# Patient Record
Sex: FEMALE | Race: WHITE | Employment: OTHER | URBAN - METROPOLITAN AREA
[De-identification: names, ages, dates, MRNs, and addresses within clinical notes are randomized per-mention and may not be internally consistent; named-entity substitution may affect disease eponyms.]

---

## 2017-04-27 ENCOUNTER — TELEPHONE (OUTPATIENT)
Dept: HEMATOLOGY | Age: 57
End: 2017-04-27

## 2017-07-19 ENCOUNTER — HOSPITAL ENCOUNTER (OUTPATIENT)
Dept: LAB | Age: 57
Discharge: HOME | End: 2017-07-19
Payer: OTHER GOVERNMENT

## 2017-07-19 ENCOUNTER — OFFICE VISIT (OUTPATIENT)
Dept: HEMATOLOGY | Age: 57
End: 2017-07-19

## 2017-07-19 VITALS
SYSTOLIC BLOOD PRESSURE: 149 MMHG | OXYGEN SATURATION: 94 % | RESPIRATION RATE: 18 BRPM | DIASTOLIC BLOOD PRESSURE: 97 MMHG | BODY MASS INDEX: 21.92 KG/M2 | HEIGHT: 64 IN | TEMPERATURE: 97.3 F | WEIGHT: 128.4 LBS | HEART RATE: 66 BPM

## 2017-07-19 DIAGNOSIS — B18.2 CHRONIC HEPATITIS C WITHOUT HEPATIC COMA (HCC): Primary | ICD-10-CM

## 2017-07-19 DIAGNOSIS — B18.2 CHRONIC HEPATITIS C WITHOUT HEPATIC COMA (HCC): ICD-10-CM

## 2017-07-19 LAB
ALBUMIN SERPL BCP-MCNC: 4 G/DL (ref 3.4–5)
ALBUMIN/GLOB SERPL: 0.9 {RATIO} (ref 0.8–1.7)
ALP SERPL-CCNC: 42 U/L (ref 45–117)
ALT SERPL-CCNC: 82 U/L (ref 13–56)
ANION GAP BLD CALC-SCNC: 9 MMOL/L (ref 3–18)
AST SERPL W P-5'-P-CCNC: 51 U/L (ref 15–37)
BASOPHILS # BLD AUTO: 0 K/UL (ref 0–0.06)
BASOPHILS # BLD: 1 % (ref 0–2)
BILIRUB DIRECT SERPL-MCNC: 0.2 MG/DL (ref 0–0.2)
BILIRUB SERPL-MCNC: 0.6 MG/DL (ref 0.2–1)
BUN SERPL-MCNC: 14 MG/DL (ref 7–18)
BUN/CREAT SERPL: 23 (ref 12–20)
CALCIUM SERPL-MCNC: 9.1 MG/DL (ref 8.5–10.1)
CHLORIDE SERPL-SCNC: 105 MMOL/L (ref 100–108)
CO2 SERPL-SCNC: 27 MMOL/L (ref 21–32)
CREAT SERPL-MCNC: 0.62 MG/DL (ref 0.6–1.3)
DIFFERENTIAL METHOD BLD: NORMAL
EOSINOPHIL # BLD: 0.1 K/UL (ref 0–0.4)
EOSINOPHIL NFR BLD: 1 % (ref 0–5)
ERYTHROCYTE [DISTWIDTH] IN BLOOD BY AUTOMATED COUNT: 14.1 % (ref 11.6–14.5)
GLOBULIN SER CALC-MCNC: 4.5 G/DL (ref 2–4)
GLUCOSE SERPL-MCNC: 111 MG/DL (ref 74–99)
HCT VFR BLD AUTO: 41.9 % (ref 35–45)
HGB BLD-MCNC: 14.1 G/DL (ref 12–16)
LYMPHOCYTES # BLD AUTO: 33 % (ref 21–52)
LYMPHOCYTES # BLD: 1.7 K/UL (ref 0.9–3.6)
MCH RBC QN AUTO: 31.2 PG (ref 24–34)
MCHC RBC AUTO-ENTMCNC: 33.7 G/DL (ref 31–37)
MCV RBC AUTO: 92.7 FL (ref 74–97)
MONOCYTES # BLD: 0.4 K/UL (ref 0.05–1.2)
MONOCYTES NFR BLD AUTO: 8 % (ref 3–10)
NEUTS SEG # BLD: 3 K/UL (ref 1.8–8)
NEUTS SEG NFR BLD AUTO: 57 % (ref 40–73)
PLATELET # BLD AUTO: 240 K/UL (ref 135–420)
PMV BLD AUTO: 9.6 FL (ref 9.2–11.8)
POTASSIUM SERPL-SCNC: 3.7 MMOL/L (ref 3.5–5.5)
PROT SERPL-MCNC: 8.5 G/DL (ref 6.4–8.2)
RBC # BLD AUTO: 4.52 M/UL (ref 4.2–5.3)
SODIUM SERPL-SCNC: 141 MMOL/L (ref 136–145)
WBC # BLD AUTO: 5.2 K/UL (ref 4.6–13.2)

## 2017-07-19 PROCEDURE — 87522 HEPATITIS C REVRS TRNSCRPJ: CPT | Performed by: INTERNAL MEDICINE

## 2017-07-19 PROCEDURE — 80048 BASIC METABOLIC PNL TOTAL CA: CPT | Performed by: INTERNAL MEDICINE

## 2017-07-19 PROCEDURE — 36415 COLL VENOUS BLD VENIPUNCTURE: CPT | Performed by: INTERNAL MEDICINE

## 2017-07-19 PROCEDURE — 87900 PHENOTYPE INFECT AGENT DRUG: CPT | Performed by: INTERNAL MEDICINE

## 2017-07-19 PROCEDURE — 86708 HEPATITIS A ANTIBODY: CPT | Performed by: INTERNAL MEDICINE

## 2017-07-19 PROCEDURE — 86704 HEP B CORE ANTIBODY TOTAL: CPT | Performed by: INTERNAL MEDICINE

## 2017-07-19 PROCEDURE — 80076 HEPATIC FUNCTION PANEL: CPT | Performed by: INTERNAL MEDICINE

## 2017-07-19 PROCEDURE — 87521 HEPATITIS C PROBE&RVRS TRNSC: CPT | Performed by: INTERNAL MEDICINE

## 2017-07-19 PROCEDURE — 85025 COMPLETE CBC W/AUTO DIFF WBC: CPT | Performed by: INTERNAL MEDICINE

## 2017-07-19 PROCEDURE — 87902 NFCT AGT GNTYP ALYS HEP C: CPT | Performed by: INTERNAL MEDICINE

## 2017-07-19 PROCEDURE — 86706 HEP B SURFACE ANTIBODY: CPT | Performed by: INTERNAL MEDICINE

## 2017-07-19 RX ORDER — ZOLPIDEM TARTRATE 5 MG/1
TABLET ORAL
COMMUNITY

## 2017-07-19 NOTE — MR AVS SNAPSHOT
Visit Information Date & Time Provider Department Dept. Phone Encounter #  
 7/19/2017  4:30 PM Tk Hawley MD [facility] of [address] [place] [address] [id number] Follow-up Instructions Return in about 6 weeks (around 8/30/2017) for [facility]. Upcoming Health Maintenance Date Due Hepatitis C Screening 1960 DTaP/Tdap/Td series (1 - Tdap) 2/26/1981 PAP AKA CERVICAL CYTOLOGY 2/26/1981 BREAST CANCER SCRN MAMMOGRAM 2/26/2010 FOBT Q 1 YEAR AGE 50-75 2/26/2010 INFLUENZA AGE 9 TO ADULT 8/1/2017 Allergies as of 7/19/2017  Review Complete On: 7/19/2017 By: Tk Hawley MD  
  
 Severity Noted Reaction Type Reactions Penicillins  07/19/2017    Itching Current Immunizations  Never Reviewed No immunizations on file. Not reviewed this visit You Were Diagnosed With   
  
 Codes Comments Chronic hepatitis C without hepatic coma (HCC)    -  Primary ICD-10-CM: B18.2 ICD-9-CM: 070.54 Vitals BP Pulse Temp Resp Height(growth percentile) (!) 149/97 (BP 1 Location: Right arm, BP Patient Position: Sitting) 66 97.3 °F (36.3 °C) (Tympanic) 18 5' 4\" (1.626 m) Weight(growth percentile) SpO2 BMI OB Status Smoking Status 128 lb 6.4 oz (58.2 kg) 94% 22.04 kg/m2 Hysterectomy Former Smoker BMI and BSA Data Body Mass Index Body Surface Area 22.04 kg/m 2 1.62 m 2 Your Updated Medication List  
  
   
This list is accurate as of: 7/19/17  5:35 PM.  Always use your most recent med list.  
  
  
  
  
 AMBIEN 5 mg tablet Generic drug:  zolpidem Take  by mouth nightly as needed for Sleep. Follow-up Instructions Return in about 6 weeks (around 8/30/2017) for [facility]. To-Do List   
 07/19/2017 Lab:  CBC WITH AUTOMATED DIFF   
  
 07/19/2017 Lab:  HCV NS5A DRUG RESISTANCE ASSAY   
  
 07/19/2017 Lab:  HCV RNA BY SOFI QL,RFLX TO QT   
  
 07/19/2017 Lab:  HEP A AB, TOTAL 07/19/2017 Lab:  HEP B SURFACE AB   
  
 07/19/2017 Lab:  HEP C GENOTYPE   
  
 07/19/2017 Lab:  HEPATIC FUNCTION PANEL   
  
 07/19/2017 Lab:  HEPATITIS B CORE AB, TOTAL   
  
 07/19/2017 Lab:  METABOLIC PANEL, BASIC   
  
 07/19/2017 Imaging:  US ABD LTD W ELASTOGRAPHY Introducing [facility] & HEALTH SERVICES! [facility] introduces [employer] patient portal. Now you can access parts of your medical record, email your doctor's office, and request medication refills online. 1. In your internet browser, go to [email or web address] 2. Click on the First Time User? Click Here link in the Sign In box. You will see the New Member Sign Up page. 3. Enter your Data[employer]Sixty Access Code exactly as it appears below. You will not need to use this code after youve completed the sign-up process. If you do not sign up before the expiration date, you must request a new code. · Data3[employer]ty Access Code: [email or web address] Expires: 10/17/2017  5:35 PM 
 
4. Enter the last four digits of your Social Security Number (xxxx) and Date of Birth (mm/dd/yyyy) as indicated and click Submit. You will be taken to the next sign-up page. 5. Create a Data3Sixty ID. This will be your Data3Sixty login ID and cannot be changed, so think of one that is secure and easy to remember. 6. Create a Data3Sixty password. You can change your password at any time. 7. Enter your Password Reset Question and Answer. This can be used at a later time if you forget your password. 8. Enter your e-mail address. You will receive e-mail notification when new information is available in [address] 9. Click Sign Up. You can now view and download portions of your medical record. 10. Click the Download Summary menu link to download a portable copy of your medical information. If you have questions, please visit the Frequently Asked Questions section of the Data3Sixty website.  Remember, Data3Sixty is NOT to be used for urgent needs. For medical emergencies, dial 911. Now available from your iPhone and Android! Please provide this summary of care documentation to your next provider. Your primary care clinician is listed as Khai Abdullahi. If you have any questions after today's visit, please call [phone].

## 2017-07-19 NOTE — PROGRESS NOTES
[address], [place], [place], [place]   April JAYME Whiting PA-C Dasie Billow, NP Lyda Daring, NP        at [facility]     [address] [facility], [address] [address]  [address].     [phone]     FAX: [phone]    at [facility], [address] [place] [address], [address]     [phone]     FAX: [phone]         Patient Care Team:  Mariluz Holguin MD as PCP - General (Family Practice)      Problem List  Date Reviewed: 7/19/2017          Codes Class Noted    Chronic hepatitis C [facility]) ICD-10-CM: B18.2  ICD-9-CM: 070.54  7/19/2017                The physicians listed above have asked me to see Jermaine Curran in consultation regarding chronic HCV and its management. All medical records sent by the referring physicians were reviewed     The patient is a 62 y.o.  female who was noted to have abnormalities in liver chemistries and subsequently tested positive for chronic HCV   in 3/3017. Risk factors for acquiring HCV are not apparent       There was no history of acute incteric hepatitis at the time of these risk factors. Ultrasound of the liver was performed in 3/2017. The results of the imaging are not available at this time. An assessment of liver fibrosis with biopsy or elastography has not been performed. The patient has never received treatment for chronic HCV. The most recent laboratory studies indicate that the liver transaminases are elevated, alkaline phosphatase is normal, tests of hepatic synthetic and metabolic function are normal, and the platelet count is normal.      The patient has no symptoms which can be attributed to the liver disorder. The patient has not experienced fatigue, pain in the right side over the liver,     The patient completes all daily activities without any functional limitations.       ALLERGIES  Allergies   Allergen Reactions    Penicillins Itching       MEDICATIONS  Current Outpatient Prescriptions   Medication Sig    zolpidem (AMBIEN) 5 mg tablet Take  by mouth nightly as needed for Sleep. No current facility-administered medications for this visit. SYSTEM REVIEW NOT RELATED TO LIVER DISEASE OR REVIEWED ABOVE:  Constitution systems: Negative for fever, chills, weight gain, weight loss. Eyes: Negative for visual changes. ENT: Negative for sore throat, painful swallowing. Respiratory: Negative for cough, hemoptysis, SOB. Cardiology: Negative for chest pain, palpitations. GI:  Negative for constipation or diarrhea. : Negative for urinary frequency, dysuria, hematuria, nocturia. Skin: Negative for rash. Hematology: Negative for easy bruising, blood clots. Musculo-skelatal: Negative for back pain, muscle pain, weakness. Neurologic: Negative for headaches, dizziness, vertigo, memory problems not related to HE. Psychology: Negative for anxiety, depression. FAMILY HISTORY:  The patient has no knowledge of the father's medical condition. The mother  of Sarcoidosis. There is no family history of liver disease. SOCIAL HISTORY:  The patient is . The spouse has been tested for HCV and is negative. The patient has 2 children, and 2 grandchildren. The patient stopped using tobacco products in . The patient has never consumed significant amounts of alcohol. The patient currently works full time as a . PHYSICAL EXAMINATION:  Visit Vitals    BP (!) 149/97 (BP 1 Location: Right arm, BP Patient Position: Sitting)    Pulse 66    Temp 97.3 °F (36.3 °C) (Tympanic)    Resp 18    Ht 5' 4\" (1.626 m)    Wt 128 lb 6.4 oz (58.2 kg)    SpO2 94%    BMI 22.04 kg/m2     General: No acute distress. Eyes: Sclera anicteric. ENT: No oral lesions. Thyroid normal.  Nodes: No adenopathy. Skin: No spider angiomata. No jaundice.   No palmar erythema. Respiratory: Lungs clear to auscultation. Cardiovascular: Regular heart rate. No murmurs. No JVD. Abdomen: Soft non-tender. Liver size normal to percussion/palpation. Spleen not palpable. No obvious ascites. Extremities: No edema. No muscle wasting. No gross arthritic changes. Neurologic: Alert and oriented. Cranial nerves grossly intact. No asterixis. LABORATORY STUDIES:  [facility] Ref Rng & Units 7/19/2017   WBC 4.6 - 13.2 K/uL 5.2   ANC 1.8 - 8.0 K/UL 3.0   HGB 12.0 - 16.0 g/dL 14.1    - 420 K/uL 240   AST 15 - 37 U/L 51 (H)   ALT 13 - 56 U/L 82 (H)   Alk Phos 45 - 117 U/L 42 (L)   Bili, Total 0.2 - 1.0 MG/DL 0.6   Bili, Direct 0.0 - 0.2 MG/DL 0.2   Albumin 3.4 - 5.0 g/dL 4.0   BUN 7.0 - 18 MG/DL 14   Creat 0.6 - 1.3 MG/DL 0.62   Na 136 - 145 mmol/L 141   K 3.5 - 5.5 mmol/L 3.7   Cl 100 - 108 mmol/L 105   CO2 21 - 32 mmol/L 27   Glucose 74 - 99 mg/dL 111 (H)     SEROLOGIES:  Serologies Latest Ref Rng & Units 7/19/2017   Hep A Ab, Total NEGATIVE   NEGATIVE   Hep B Core Ab, Total NEGATIVE   NEGATIVE   Hep B Surface Ab >10.0 mIU/mL <3.10 (L)   Hep B Surface Ab Interp POS   NEGATIVE (A)   HCV RT-PCR, Quant IU/mL [id number]     LIVER HISTOLOGY:  Not available or performed    ENDOSCOPIC PROCEDURES:  Not available or performed    RADIOLOGY:  Not available or performed    OTHER TESTING:  Not available or performed    ASSESSMENT AND PLAN:  Chronic HCV of unclear severity. Liver function is normal.  The platelet count is normal.      Have performed laboratory testing to monitor liver function and degree of liver injury. This included BMP, hepatic panel, CBC with platelet count,     Will perform and/or review results of HCV viral load and HCV genotype to define the specific treatment and duration of treatment that will be required. Will perform serologic and virologic studies to assess for other causes of chronic liver disease.       Will perform imaging of the liver with ultrasound. The need to assess liver fibrosis was discussed. Sheer wave elastography can assess liver fibrosis and determine if a patient has advanced fibrosis or cirrhosis without the need for liver biopsy. Sheer wave elastography is now available at [facility]. This will be scheduled. The patient has not previously been treated for HCV. Discussed the treatment alternatives. The SVR/cure rate for HCV now exceeds 90% with just oral anti-viral therapy and no interferon injections or significant side effects for most patients with HCV. The specific treatment is dependent upon genotype, viral load and histology. The patient was directed to continue all current medications at the current dosages. There are no contraindications for the patient to take any medications that are necessary for treatment of other medical issues. The patient was counseled regarding alcohol consumption. The patient was counseled regarding use of illicit drugs. Vaccination for viral hepatitis A and B is recommended since the patient has no serologic evidence of previous exposure or vaccination with immunity. All of the above issues were discussed with the patient. All questions were answered. The patient expressed a clear understanding of the above. [address] [address] in 6 weeks to initiate HCV treatment.     Makeda Franco MD  Liver [place] of [facility], [address] [place] [address], [address]  [phone]

## 2017-07-20 LAB
HBV SURFACE AB SER QL IA: NEGATIVE
HBV SURFACE AB SERPL IA-ACNC: <3.1 MIU/ML
HEP BS AB COMMENT,HBSAC: ABNORMAL

## 2017-07-21 LAB
HAV AB SER QL IA: NEGATIVE
HBV CORE AB SERPL QL IA: NEGATIVE

## 2017-07-22 LAB
HCV RNA SERPL NAA+PROBE-ACNC: NORMAL IU/ML
HCV RNA SERPL NAA+PROBE-LOG IU: 6.77 LOG10 IU/ML
HCV RNA SERPL QL NAA+PROBE: POSITIVE
TEST INFORMATION:, 550031: NORMAL

## 2017-07-23 LAB
HCV GENTYP SERPL NAA+PROBE: 4
PLEASE NOTE, 550474: NORMAL

## 2017-07-27 LAB
HCV NS5 MUT DET ISLT GENOTYP: NORMAL
HCV RESIS PANELL ISLT GENOTYP: NORMAL
REF LAB TEST METHOD: NORMAL

## 2018-02-05 ENCOUNTER — OFFICE VISIT (OUTPATIENT)
Dept: HEMATOLOGY | Age: 58
End: 2018-02-05

## 2018-02-05 VITALS
SYSTOLIC BLOOD PRESSURE: 137 MMHG | BODY MASS INDEX: 23.73 KG/M2 | HEART RATE: 105 BPM | OXYGEN SATURATION: 98 % | WEIGHT: 139 LBS | RESPIRATION RATE: 18 BRPM | DIASTOLIC BLOOD PRESSURE: 103 MMHG | HEIGHT: 64 IN | TEMPERATURE: 98 F

## 2018-02-05 DIAGNOSIS — B18.2 CHRONIC HEPATITIS C WITHOUT HEPATIC COMA (HCC): Primary | ICD-10-CM

## 2018-02-05 NOTE — PROGRESS NOTES
Jia Alicea is a 62 y.o. female    No chief complaint on file. 1. Have you been to the ER, urgent care clinic or hospitalized since your last visit? YES.     2. Have you seen or consulted any other health care providers outside of the [address] [place] [address] since your last visit (Include any pap smears or colon screening)? YES  Patient went to [facility] ER for stomach virus.   Learning Assessment 7/19/2017   PRIMARY LEARNER Patient   PRIMARY LANGUAGE ENGLISH   LEARNER PREFERENCE PRIMARY READING   ANSWERED BY self   RELATIONSHIP SELF

## 2018-02-05 NOTE — MR AVS SNAPSHOT
[address] [address] 
[phone] Patient: Haja Bahena MRN: [id number] VEM:[birth year] Visit Information Date & Time Provider Department Dept. Phone Encounter #  
 2/5/2018  4:30 PM MD Ann Coleman [address] of  [address] [id number] Upcoming Health Maintenance Date Due Pneumococcal 19-64 Medium Risk (1 of 1 - PPSV23) 2/26/1979 DTaP/Tdap/Td series (1 - Tdap) 2/26/1981 PAP AKA CERVICAL CYTOLOGY 2/26/1981 BREAST CANCER SCRN MAMMOGRAM 2/26/2010 FOBT Q 1 YEAR AGE 50-75 2/26/2010 Influenza Age 5 to Adult 8/1/2017 Allergies as of 2/5/2018  Review Complete On: 2/5/2018 By: Jean Marie Salcedo Severity Noted Reaction Type Reactions Penicillins  07/19/2017    Itching Current Immunizations  Never Reviewed No immunizations on file. Not reviewed this visit You Were Diagnosed With   
  
 Codes Comments Chronic hepatitis C without hepatic coma (HCC)    -  Primary ICD-10-CM: B18.2 ICD-9-CM: 070.54 Vitals BP Pulse Temp Resp Height(growth percentile) (!) 137/103 (BP 1 Location: Right arm, BP Patient Position: Sitting) (!) 105 98 °F (36.7 °C) (Tympanic) 18 5' 4\" (1.626 m) Weight(growth percentile) SpO2 BMI OB Status Smoking Status 139 lb (63 kg) 98% 23.86 kg/m2 Hysterectomy Former Smoker BMI and BSA Data Body Mass Index Body Surface Area  
 23.86 kg/m 2 1.69 m 2 Your Updated Medication List  
  
   
This list is accurate as of: 2/5/18  5:02 PM.  Always use your most recent med list.  
  
  
  
  
 AMBIEN 5 mg tablet Generic drug:  zolpidem Take  by mouth nightly as needed for Sleep. To-Do List   
 02/05/2018 Imaging:  US ABD LTD W ELASTOGRAPHY Introducing [facility] & HEALTH SERVICES!    
 Mallory Alvarenga introduces [employer] patient portal. Now you can access parts of your medical record, email your doctor's office, and request medication refills online. 1. In your internet browser, go to [email or web address] "[employer]"/Branch [email or web address] 2. Click on the First Time User? Click Here link in the Sign In box. You will see the New Member Sign Up page. 3. Enter your [employer] Access Code exactly as it appears below. You will not need to use this code after youve completed the sign-up process. If you do not sign up before the expiration date, you must request a new code. · [employer] Access Code: [email or web address] Expires: 5/6/2018  5:02 PM 
 
4. Enter the last four digits of your Social Security Number (xxxx) and Date of Birth (mm/dd/yyyy) as indicated and click Submit. You will be taken to the next sign-up page. 5. Create a NetDevices ID. This will be your NetDevices login ID and cannot be changed, so think of one that is secure and easy to remember. 6. Create a Net[employer]ices password. You can change your password at any time. 7. Enter your Password Reset Question and Answer. This can be used at a later time if you forget your password. 8. Enter your e-mail address. You will receive e-mail notification when new information is available in [address] 9. Click Sign Up. You can now view and download portions of your medical record. 10. Click the Download Summary menu link to download a portable copy of your medical information. If you have questions, please visit the Frequently Asked Questions section of the Net[employer] website. Remember, NetDevices is NOT to be used for urgent needs. For medical emergencies, dial 911. Now available from your iPhone and Android! Please provide this summary of care documentation to your next provider. Your primary care clinician is listed as Nelly Roy. If you have any questions after today's visit, please call [phone].

## 2018-02-06 NOTE — PROGRESS NOTES
[address] MD, [place], [place], [place]       Nickolas Salgado, ADAMARIS Caicedo, [facility]   Jorge Adam, JAYME Whiting [facility] [address]    at [facility] AT [address], [address] [place] [address], [address].    [phone]    FAX: [address] [place] [address], [address]    [phone]    FAX: [phone]       Patient Care Team:  Kaiser Boyle MD as PCP - General (Family Practice)      Problem List  Date Reviewed: 7/23/2017          Codes Class Noted    Chronic hepatitis C [facility]) ICD-10-CM: B18.2  ICD-9-CM: 070.54  7/19/2017              Zackary Zhong returns to the [facility] for management of chronic HCV. The active problem list, all pertinent past medical history, medications, and laboratory findings related to the liver disorder were reviewed with the patient. The patient is a 62 y.o.  female who was noted to have abnormalities in liver chemistries and subsequently tested positive for chronic HCV in 3/3017. Risk factors for acquiring HCV are not apparent       Ultrasound of the liver was performed in 3/2017. The results of the imaging are not available at this time. An assessment of liver fibrosis with biopsy or elastography has not been performed. The patient has never received treatment for chronic HCV. The most recent laboratory studies indicate that the liver transaminases are elevated, alkaline phosphatase is normal, tests of hepatic synthetic and metabolic function are normal, and the platelet count is normal.      The patient has no symptoms which can be attributed to the liver disorder.     The patient has not experienced fatigue, pain in the right side over the liver. The patient completes all daily activities without any functional limitations. ALLERGIES  Allergies   Allergen Reactions    Penicillins Itching       MEDICATIONS  Current Outpatient Prescriptions   Medication Sig    zolpidem (AMBIEN) 5 mg tablet Take  by mouth nightly as needed for Sleep. No current facility-administered medications for this visit. SYSTEM REVIEW NOT RELATED TO LIVER DISEASE OR REVIEWED ABOVE:  Constitution systems: Negative for fever, chills, weight gain, weight loss. Eyes: Negative for visual changes. ENT: Negative for sore throat, painful swallowing. Respiratory: Negative for cough, hemoptysis, SOB. Cardiology: Negative for chest pain, palpitations. GI:  Negative for constipation or diarrhea. : Negative for urinary frequency, dysuria, hematuria, nocturia. Skin: Negative for rash. Hematology: Negative for easy bruising, blood clots. Musculo-skelatal: Negative for back pain, muscle pain, weakness. Neurologic: Negative for headaches, dizziness, vertigo, memory problems not related to HE. Psychology: Negative for anxiety, depression. FAMILY HISTORY:  The patient has no knowledge of the father's medical condition. The mother  of Sarcoidosis. There is no family history of liver disease. SOCIAL HISTORY:  The patient is . The spouse has been tested for HCV and is negative. The patient has 2 children, and 2 grandchildren. The patient stopped using tobacco products in . The patient has never consumed significant amounts of alcohol. The patient currently works full time as a . PHYSICAL EXAMINATION:  Visit Vitals    BP (!) 137/103 (BP 1 Location: Right arm, BP Patient Position: Sitting)    Pulse (!) 105    Temp 98 °F (36.7 °C) (Tympanic)    Resp 18    Ht 5' 4\" (1.626 m)    Wt 139 lb (63 kg)    SpO2 98%    BMI 23.86 kg/m2     General: No acute distress.    Eyes: Sclera anicteric. ENT: No oral lesions. Thyroid normal.  Nodes: No adenopathy. Skin: No spider angiomata. No jaundice. No palmar erythema. Respiratory: Lungs clear to auscultation. Cardiovascular: Regular heart rate. No murmurs. No JVD. Abdomen: Soft non-tender. Liver size normal to percussion/palpation. Spleen not palpable. No obvious ascites. Extremities: No edema. No muscle wasting. No gross arthritic changes. Neurologic: Alert and oriented. Cranial nerves grossly intact. No asterixis. LABORATORY STUDIES:  Liver [facility] Ref Rng & Units 7/19/2017   WBC 4.6 - 13.2 K/uL 5.2   ANC 1.8 - 8.0 K/UL 3.0   HGB 12.0 - 16.0 g/dL 14.1    - 420 K/uL 240   AST 15 - 37 U/L 51 (H)   ALT 13 - 56 U/L 82 (H)   Alk Phos 45 - 117 U/L 42 (L)   Bili, Total 0.2 - 1.0 MG/DL 0.6   Bili, Direct 0.0 - 0.2 MG/DL 0.2   Albumin 3.4 - 5.0 g/dL 4.0   BUN 7.0 - 18 MG/DL 14   Creat 0.6 - 1.3 MG/DL 0.62   Na 136 - 145 mmol/L 141   K 3.5 - 5.5 mmol/L 3.7   Cl 100 - 108 mmol/L 105   CO2 21 - 32 mmol/L 27   Glucose 74 - 99 mg/dL 111 (H)     SEROLOGIES:  Serologies Latest Ref Rng & Units 7/19/2017   Hep A Ab, Total NEGATIVE   NEGATIVE   Hep B Core Ab, Total NEGATIVE   NEGATIVE   Hep B Surface Ab >10.0 mIU/mL <3.10 (L)   Hep B Surface Ab Interp POS   NEGATIVE (A)   HCV RT-PCR, Quant IU/mL [id number]     7/2017. HCV genotype 4    LIVER HISTOLOGY:  Not available or performed    ENDOSCOPIC PROCEDURES:  Not available or performed    RADIOLOGY:  Not available or performed    OTHER TESTING:  Not available or performed    ASSESSMENT AND PLAN:  Chronic HCV of unclear severity. Liver function is normal.  The platelet count is normal.      Will perform and/or review results of HCV viral load and HCV genotype to define the specific treatment and duration of treatment that will be required. Will perform serologic and virologic studies to assess for other causes of chronic liver disease.       Will perform imaging of the liver with ultrasound. The need to assess liver fibrosis was discussed. Sheer wave elastography can assess liver fibrosis and determine if a patient has advanced fibrosis or cirrhosis without the need for liver biopsy. Sheer wave elastography is now available at [facility]. This will be scheduled. The patient has not previously been treated for HCV. The patient has HCV genotype 4. Discussed the treatment alternatives. The SVR/cure rate for HCV now exceeds 90% with just oral anti-viral therapy and no interferon injections or significant side effects for most patients with HCV. The specific treatment is dependent upon genotype, viral load and histology. The patient should be treated with Harvoni (sofasbuvir and ledipasvir), Mavyret (glecaprevir and piprentasvir) or Epclusa (sofosbuvir and valpitasvir). The SVR/cure rate for is over 97%. The patient was directed to continue all current medications at the current dosages. There are no contraindications for the patient to take any medications that are necessary for treatment of other medical issues. The patient was counseled regarding alcohol consumption. Vaccination for viral hepatitis A and B is recommended since the patient has no serologic evidence of previous exposure or vaccination with immunity. All of the above issues were discussed with the patient. All questions were answered. The patient expressed a clear understanding of the above. The patient is extremely apprehensive about taking medication of any kind. Lengthy discussion regarding risks of untreated HCV vs side effects of medication. Patient has agreed to have ultrasound with elastography to assess liver fibrosis. Will discuss results and options after this is completed.      Junior Hubbard MD  Liver [place] of [address] [place] [address], [address] [place] [address] Street [address]  [phone]

## 2018-02-21 ENCOUNTER — HOSPITAL ENCOUNTER (OUTPATIENT)
Dept: ULTRASOUND IMAGING | Age: 58
Discharge: HOME | End: 2018-02-21
Attending: NURSE PRACTITIONER
Payer: OTHER GOVERNMENT

## 2018-02-21 DIAGNOSIS — B18.2 CHRONIC HEPATITIS C WITHOUT HEPATIC COMA (HCC): ICD-10-CM

## 2018-02-21 PROCEDURE — 0346T US ABD LTD W ELASTOGRAPHY: CPT

## 2018-02-21 NOTE — PROGRESS NOTES
Elastography suggests normal to mild fibrosis.  Suggest patient schedule an appointment to discuss HCV treatment

## 2018-02-26 NOTE — PROGRESS NOTES
Called patient to inform patient of NP Emely Jones's findings. Patient confirmed understanding. Patient scheduled to see JAYME Caruso 04/03/2018.

## 2022-03-19 PROBLEM — B18.2 CHRONIC HEPATITIS C (HCC): Status: ACTIVE | Noted: 2017-07-19

## 2023-11-13 ENCOUNTER — HOSPITAL ENCOUNTER (OUTPATIENT)
Facility: HOSPITAL | Age: 63
Discharge: HOME | End: 2023-11-16
Attending: ORTHOPAEDIC SURGERY
Payer: OTHER GOVERNMENT

## 2023-11-13 DIAGNOSIS — M89.8X5 PAIN IN RIGHT FEMUR: ICD-10-CM

## 2023-11-13 DIAGNOSIS — S72.301A CLOSED FRACTURE OF SHAFT OF RIGHT FEMUR, UNSPECIFIED FRACTURE MORPHOLOGY, INITIAL ENCOUNTER (HCC): ICD-10-CM

## 2023-11-13 PROCEDURE — 73700 CT LOWER EXTREMITY W/O DYE: CPT

## 2024-07-26 ENCOUNTER — CLINICAL DOCUMENTATION (OUTPATIENT)
Age: 64
End: 2024-07-26

## 2024-08-01 ENCOUNTER — CLINICAL DOCUMENTATION (OUTPATIENT)
Age: 64
End: 2024-08-01

## 2024-08-01 NOTE — PROGRESS NOTES
Rcvd referral from Nathaly Zazueta auth# [id number] dos 7/3/2024 - 7/3/2025    Scanned into media for reference    efs

## 2024-08-02 RX ORDER — OXYCODONE HYDROCHLORIDE 5 MG/1
5 TABLET ORAL EVERY 6 HOURS PRN
COMMUNITY
End: 2024-08-31

## 2024-08-02 RX ORDER — CALCIUM CARBONATE/VITAMIN D3 600 MG-10
TABLET ORAL
COMMUNITY
Start: 2024-05-17

## 2024-08-02 RX ORDER — FAMOTIDINE 20 MG/1
20 TABLET, FILM COATED ORAL
COMMUNITY
Start: 2024-06-21 | End: 2024-08-31

## 2024-08-02 RX ORDER — AMOXICILLIN 250 MG
2 CAPSULE ORAL
COMMUNITY
Start: 2023-06-14 | End: 2024-08-31

## 2024-08-02 RX ORDER — TRAMADOL HYDROCHLORIDE 50 MG/1
50 TABLET ORAL EVERY 6 HOURS PRN
COMMUNITY
Start: 2023-08-07

## 2024-08-05 ENCOUNTER — OFFICE VISIT (OUTPATIENT)
Age: 64
End: 2024-08-05
Payer: OTHER GOVERNMENT

## 2024-08-05 ENCOUNTER — HOSPITAL ENCOUNTER (OUTPATIENT)
Facility: HOSPITAL | Age: 64
Setting detail: SPECIMEN
Discharge: HOME | End: 2024-08-08
Payer: OTHER GOVERNMENT

## 2024-08-05 VITALS
WEIGHT: 141 LBS | DIASTOLIC BLOOD PRESSURE: 88 MMHG | BODY MASS INDEX: 24.07 KG/M2 | HEIGHT: 64 IN | HEART RATE: 80 BPM | OXYGEN SATURATION: 98 % | SYSTOLIC BLOOD PRESSURE: 143 MMHG

## 2024-08-05 DIAGNOSIS — K76.89 LIVER CYST: ICD-10-CM

## 2024-08-05 DIAGNOSIS — B18.2 CHRONIC HEPATITIS C WITHOUT HEPATIC COMA (HCC): Primary | ICD-10-CM

## 2024-08-05 DIAGNOSIS — B18.2 CHRONIC HEPATITIS C WITHOUT HEPATIC COMA (HCC): ICD-10-CM

## 2024-08-05 LAB
ALBUMIN SERPL-MCNC: 4.2 G/DL (ref 3.4–5)
ALBUMIN/GLOB SERPL: 1.1 (ref 0.8–1.7)
ALP SERPL-CCNC: 55 U/L (ref 45–117)
ALT SERPL-CCNC: 62 U/L (ref 13–56)
ANION GAP SERPL CALC-SCNC: 6 MMOL/L (ref 3–18)
AST SERPL-CCNC: 38 U/L (ref 10–38)
BASOPHILS # BLD: 0 K/UL (ref 0–0.1)
BASOPHILS NFR BLD: 0 % (ref 0–2)
BILIRUB DIRECT SERPL-MCNC: 0.3 MG/DL (ref 0–0.2)
BILIRUB SERPL-MCNC: 1.1 MG/DL (ref 0.2–1)
BUN SERPL-MCNC: 12 MG/DL (ref 7–18)
BUN/CREAT SERPL: 20 (ref 12–20)
CALCIUM SERPL-MCNC: 9.5 MG/DL (ref 8.5–10.1)
CHLORIDE SERPL-SCNC: 105 MMOL/L (ref 100–111)
CO2 SERPL-SCNC: 28 MMOL/L (ref 21–32)
CREAT SERPL-MCNC: 0.59 MG/DL (ref 0.6–1.3)
DIFFERENTIAL METHOD BLD: ABNORMAL
EOSINOPHIL # BLD: 0.1 K/UL (ref 0–0.4)
EOSINOPHIL NFR BLD: 1 % (ref 0–5)
ERYTHROCYTE [DISTWIDTH] IN BLOOD BY AUTOMATED COUNT: 13.7 % (ref 11.6–14.5)
GLOBULIN SER CALC-MCNC: 3.7 G/DL (ref 2–4)
GLUCOSE SERPL-MCNC: 93 MG/DL (ref 74–99)
HCT VFR BLD AUTO: 45.8 % (ref 35–45)
HGB BLD-MCNC: 15.3 G/DL (ref 12–16)
IMM GRANULOCYTES # BLD AUTO: 0 K/UL (ref 0–0.04)
IMM GRANULOCYTES NFR BLD AUTO: 0 % (ref 0–0.5)
INR PPP: 1 (ref 0.9–1.1)
LYMPHOCYTES # BLD: 2 K/UL (ref 0.9–3.6)
LYMPHOCYTES NFR BLD: 28 % (ref 21–52)
MCH RBC QN AUTO: 31.7 PG (ref 24–34)
MCHC RBC AUTO-ENTMCNC: 33.4 G/DL (ref 31–37)
MCV RBC AUTO: 95 FL (ref 78–100)
MONOCYTES # BLD: 0.5 K/UL (ref 0.05–1.2)
MONOCYTES NFR BLD: 7 % (ref 3–10)
NEUTS SEG # BLD: 4.7 K/UL (ref 1.8–8)
NEUTS SEG NFR BLD: 63 % (ref 40–73)
NRBC # BLD: 0 K/UL (ref 0–0.01)
NRBC BLD-RTO: 0 PER 100 WBC
PLATELET # BLD AUTO: 293 K/UL (ref 135–420)
PMV BLD AUTO: 10.4 FL (ref 9.2–11.8)
POTASSIUM SERPL-SCNC: 3.8 MMOL/L (ref 3.5–5.5)
PROT SERPL-MCNC: 7.9 G/DL (ref 6.4–8.2)
PROTHROMBIN TIME: 13.3 SEC (ref 11.9–14.9)
RBC # BLD AUTO: 4.82 M/UL (ref 4.2–5.3)
SODIUM SERPL-SCNC: 139 MMOL/L (ref 136–145)
WBC # BLD AUTO: 7.4 K/UL (ref 4.6–13.2)

## 2024-08-05 PROCEDURE — 80048 BASIC METABOLIC PNL TOTAL CA: CPT

## 2024-08-05 PROCEDURE — 99204 OFFICE O/P NEW MOD 45 MIN: CPT | Performed by: INTERNAL MEDICINE

## 2024-08-05 PROCEDURE — 85025 COMPLETE CBC W/AUTO DIFF WBC: CPT

## 2024-08-05 PROCEDURE — 80076 HEPATIC FUNCTION PANEL: CPT

## 2024-08-05 PROCEDURE — 85610 PROTHROMBIN TIME: CPT

## 2024-08-05 PROCEDURE — 81596 NFCT DS CHRNC HCV 6 ASSAYS: CPT

## 2024-08-05 PROCEDURE — 87902 NFCT AGT GNTYP ALYS HEP C: CPT

## 2024-08-05 PROCEDURE — 87522 HEPATITIS C REVRS TRNSCRPJ: CPT

## 2024-08-05 PROCEDURE — 36415 COLL VENOUS BLD VENIPUNCTURE: CPT

## 2024-08-05 RX ORDER — ERGOCALCIFEROL 1.25 MG/1
CAPSULE, LIQUID FILLED ORAL
COMMUNITY
Start: 2024-05-17

## 2024-08-05 NOTE — PROGRESS NOTES
[facility]      Josr Amador MD, FACP, FACG, FAASLD      Cheyenne Figueroa, PA-C    Maureen Leahy, [facility]   Sharda Price, [facility]   Natasha Alli, [facility]  He Kaba, [facility]   Jie Renteria, [facility]   Jessica Lerner, [facility]      [facility]   at [facility]   [address], [address]   [place]  [address] [phone]   FAX: [phone]  [facility]   [address], [address]   [place]  [address] [phone]   FAX: [phone]       Patient Care Team:  Justin Perez MD as PCP - General (Internal Medicine)  Sia Jones MD (Internal Medicine)  Jess Betts MD (Family Medicine)      Patient Active Problem List   Diagnosis    Chronic hepatitis C (HCC)       Asuncion Oscar returns to the [facility] for management of chronic HCV.     The active problem list, all pertinent past medical history, medications, and laboratory findings related to the liver disorder were reviewed with the patient.    The patient is a 57 y.o.  female who was noted to have abnormalities in liver chemistries and subsequently tested positive for chronic HCV in 3/3017.    This is the first appointment at [facility] since 2/2018.    The patient has never received treatment for chronic HCV.    The most recent imaging of the liver was CT performed in 7/2024.  Results suggest the liver appears normal.  There is a non-enhancing mass 2.1 x 1.6 cm in right lobe, segment 6.    In the office today the patient has the following symptoms:  The patient feels well and has no complaints.    The patient is not experiencing the following symptoms which are commonly seen in this liver disorder:   fatigue,   pain in the right side over the liver,     The patient completes all daily activities without any functional  other medical problems in patients with chronic liver disease  There are no contraindications for the patient to take most medications that are necessary for treatment of other medical issues.    Counseling for alcohol in patients with chronic liver disease  The patient was counseled regarding alcohol consumption and the effect of alcohol on chronic liver disease.  The patient does not consume any significant amount of alcohol.    Vaccinations   Vaccination for viral hepatitis A and B is recommended since the patient has no serologic evidence of previous exposure or vaccination with immunity.    Routine vaccinations against other bacterial and viral agents can be performed as indicated.  Annual flu vaccination should be administered if indicated.      ALLERGIES  Allergies   Allergen Reactions    Penicillins Anaphylaxis, Hives, Itching, Rash and Other (See Comments)     per spouse       MEDICATIONS  Current Outpatient Medications   Medication Instructions    calcium carb-cholecalciferol 600-10 MG-MCG TABS per tab Oral    Sofosbuvir-Velpatasvir 400-100 MG TABS 1 tablet, Oral, DAILY    traMADol (ULTRAM) 50 mg, Oral, EVERY 6 HOURS PRN    vitamin D (ERGOCALCIFEROL) 1.25 MG (60990 UT) CAPS capsule        SYSTEM REVIEW NOT RELATED TO LIVER DISEASE OR REVIEWED ABOVE:  Constitution systems: Negative for fever, chills, weight gain, weight loss.  Eyes: Negative for visual changes.  ENT: Negative for sore throat, painful swallowing.  Respiratory: Negative for cough, hemoptysis, SOB.  Cardiology: Negative for chest pain, palpitations.  GI: Negative for constipation or diarrhea.  : Negative for urinary frequency, dysuria, hematuria, nocturia.  Skin: Negative for rash.  Hematology: Negative for easy bruising, blood clots.  Musculo-skelatal: Negative for back pain, muscle pain, weakness.  Neurologic: Negative for headaches, dizziness, vertigo, memory problems not related to HE.  Psychology: Negative for anxiety,

## 2024-08-06 LAB
A2 MACROGLOB SERPL-MCNC: 446 MG/DL (ref 110–276)
ALT SERPL-CCNC: 57 IU/L (ref 0–40)
APO A-I SERPL-MCNC: 159 MG/DL (ref 116–209)
BILIRUB SERPL-MCNC: 0.9 MG/DL (ref 0–1.2)
FIBROSIS SCORE: 0.65 (ref 0–0.21)
FIBROSIS SCORING:: ABNORMAL
FIBROSIS STAGE: ABNORMAL
GGT SERPL-CCNC: 20 IU/L (ref 0–60)
HAPTOGLOB SERPL-MCNC: 106 MG/DL (ref 37–355)
INTERPRETATION: ABNORMAL
LABORATORY COMMENT REPORT: ABNORMAL
LIMITATIONS:: ABNORMAL
Lab: ABNORMAL
NECROINFLAMM ACTIVITY SCORING:: 0.47 (ref 0–0.17)
NECROINFLAMM ACTIVITY SCORING:: ABNORMAL
NECROINFLAMMAT ACTIVITY GRADE: ABNORMAL

## 2024-08-08 LAB
HCV GENTYP SERPL NAA+PROBE: 4
LABORATORY COMMENT REPORT: NORMAL

## 2024-08-09 LAB
HCV RNA SERPL NAA+PROBE-LOG IU: 6.78 HCV LOG 10IU/ML
HCV RNA SERPL PROBE AMP-ACNC: ABNORMAL HCVIU/ML

## 2024-08-27 ENCOUNTER — TELEPHONE (OUTPATIENT)
Age: 64
End: 2024-08-27

## 2024-08-27 NOTE — TELEPHONE ENCOUNTER
Pt called wondering about the medication dr johnson discussed at her last visit. Pt says her pharmacy never received it. Please give her a call back to update her.

## 2024-08-30 NOTE — TELEPHONE ENCOUNTER
Spoke with patient and advised that notes have not been closed so there is a chance that the med has not been able to have been ordered yet because of that. Advised as soon as the notes are completed then the med can be ordered and then after that it can take a couple weeks to receive the order. Advised patient to give some more time and apologized for the delay. Patient understanding.    efs

## 2024-08-31 RX ORDER — VELPATASVIR AND SOFOSBUVIR 100; 400 MG/1; MG/1
1 TABLET, FILM COATED ORAL DAILY
Qty: 28 TABLET | Refills: 2 | Status: SHIPPED | OUTPATIENT
Start: 2024-08-31

## 2024-09-03 ENCOUNTER — TELEPHONE (OUTPATIENT)
Age: 64
End: 2024-09-03

## 2024-09-11 ENCOUNTER — TELEPHONE (OUTPATIENT)
Age: 64
End: 2024-09-11

## 2024-10-16 ENCOUNTER — CLINICAL DOCUMENTATION (OUTPATIENT)
Age: 64
End: 2024-10-16

## 2024-10-16 NOTE — PROGRESS NOTES
Patients disc of CT and US has been scanned in by radiology.     Disc mailed back to patient.    efs

## 2025-02-04 ENCOUNTER — TELEPHONE (OUTPATIENT)
Age: 65
End: 2025-02-04

## 2025-04-07 ENCOUNTER — OFFICE VISIT (OUTPATIENT)
Age: 65
End: 2025-04-07
Payer: MEDICARE

## 2025-04-07 ENCOUNTER — HOSPITAL ENCOUNTER (OUTPATIENT)
Facility: HOSPITAL | Age: 65
Setting detail: SPECIMEN
Discharge: HOME | End: 2025-04-10
Payer: MEDICARE

## 2025-04-07 VITALS
WEIGHT: 143.8 LBS | DIASTOLIC BLOOD PRESSURE: 81 MMHG | TEMPERATURE: 97.4 F | BODY MASS INDEX: 24.55 KG/M2 | OXYGEN SATURATION: 99 % | SYSTOLIC BLOOD PRESSURE: 147 MMHG | HEIGHT: 64 IN | HEART RATE: 80 BPM

## 2025-04-07 DIAGNOSIS — B18.2 CHRONIC HEPATITIS C WITHOUT HEPATIC COMA (HCC): Primary | ICD-10-CM

## 2025-04-07 DIAGNOSIS — B18.2 CHRONIC HEPATITIS C WITHOUT HEPATIC COMA (HCC): ICD-10-CM

## 2025-04-07 LAB
ALBUMIN SERPL-MCNC: 4 G/DL (ref 3.4–5)
ALBUMIN/GLOB SERPL: 1.1 (ref 0.8–1.7)
ALP SERPL-CCNC: 57 U/L (ref 45–117)
ALT SERPL-CCNC: 16 U/L (ref 13–56)
ANION GAP SERPL CALC-SCNC: 5 MMOL/L (ref 3–18)
AST SERPL-CCNC: 14 U/L (ref 10–38)
BASOPHILS # BLD: 0.06 K/UL (ref 0–0.1)
BASOPHILS NFR BLD: 0.7 % (ref 0–2)
BILIRUB DIRECT SERPL-MCNC: 0.2 MG/DL (ref 0–0.2)
BILIRUB SERPL-MCNC: 0.8 MG/DL (ref 0.2–1)
BUN SERPL-MCNC: 14 MG/DL (ref 7–18)
BUN/CREAT SERPL: 29 (ref 12–20)
CALCIUM SERPL-MCNC: 9.3 MG/DL (ref 8.5–10.1)
CHLORIDE SERPL-SCNC: 106 MMOL/L (ref 100–111)
CO2 SERPL-SCNC: 29 MMOL/L (ref 21–32)
CREAT SERPL-MCNC: 0.48 MG/DL (ref 0.6–1.3)
DIFFERENTIAL METHOD BLD: NORMAL
EOSINOPHIL # BLD: 0.12 K/UL (ref 0–0.4)
EOSINOPHIL NFR BLD: 1.4 % (ref 0–5)
ERYTHROCYTE [DISTWIDTH] IN BLOOD BY AUTOMATED COUNT: 13.8 % (ref 11.6–14.5)
GLOBULIN SER CALC-MCNC: 3.8 G/DL (ref 2–4)
GLUCOSE SERPL-MCNC: 102 MG/DL (ref 74–99)
HCT VFR BLD AUTO: 44.7 % (ref 35–45)
HGB BLD-MCNC: 14.7 G/DL (ref 12–16)
IMM GRANULOCYTES # BLD AUTO: 0.02 K/UL (ref 0–0.04)
IMM GRANULOCYTES NFR BLD AUTO: 0.2 % (ref 0–0.5)
LYMPHOCYTES # BLD: 2.36 K/UL (ref 0.9–3.3)
LYMPHOCYTES NFR BLD: 27.1 % (ref 21–52)
MCH RBC QN AUTO: 31.5 PG (ref 24–34)
MCHC RBC AUTO-ENTMCNC: 32.9 G/DL (ref 31–37)
MCV RBC AUTO: 95.9 FL (ref 78–100)
MONOCYTES # BLD: 0.66 K/UL (ref 0.05–1.2)
MONOCYTES NFR BLD: 7.6 % (ref 3–10)
NEUTS SEG # BLD: 5.49 K/UL (ref 1.8–8)
NEUTS SEG NFR BLD: 63 % (ref 40–73)
NRBC # BLD: 0 K/UL (ref 0–0.01)
NRBC BLD-RTO: 0 PER 100 WBC
PLATELET # BLD AUTO: 331 K/UL (ref 135–420)
PMV BLD AUTO: 10.3 FL (ref 9.2–11.8)
POTASSIUM SERPL-SCNC: 4 MMOL/L (ref 3.5–5.5)
PROT SERPL-MCNC: 7.8 G/DL (ref 6.4–8.2)
RBC # BLD AUTO: 4.66 M/UL (ref 4.2–5.3)
SODIUM SERPL-SCNC: 140 MMOL/L (ref 136–145)
WBC # BLD AUTO: 8.7 K/UL (ref 4.6–13.2)

## 2025-04-07 PROCEDURE — 36415 COLL VENOUS BLD VENIPUNCTURE: CPT

## 2025-04-07 PROCEDURE — 80076 HEPATIC FUNCTION PANEL: CPT

## 2025-04-07 PROCEDURE — 80048 BASIC METABOLIC PNL TOTAL CA: CPT

## 2025-04-07 PROCEDURE — 3017F COLORECTAL CA SCREEN DOC REV: CPT | Performed by: NURSE PRACTITIONER

## 2025-04-07 PROCEDURE — 1090F PRES/ABSN URINE INCON ASSESS: CPT | Performed by: NURSE PRACTITIONER

## 2025-04-07 PROCEDURE — 1036F TOBACCO NON-USER: CPT | Performed by: NURSE PRACTITIONER

## 2025-04-07 PROCEDURE — G8428 CUR MEDS NOT DOCUMENT: HCPCS | Performed by: NURSE PRACTITIONER

## 2025-04-07 PROCEDURE — 1123F ACP DISCUSS/DSCN MKR DOCD: CPT | Performed by: NURSE PRACTITIONER

## 2025-04-07 PROCEDURE — 85025 COMPLETE CBC W/AUTO DIFF WBC: CPT

## 2025-04-07 PROCEDURE — G8400 PT W/DXA NO RESULTS DOC: HCPCS | Performed by: NURSE PRACTITIONER

## 2025-04-07 PROCEDURE — 87522 HEPATITIS C REVRS TRNSCRPJ: CPT

## 2025-04-07 PROCEDURE — G8420 CALC BMI NORM PARAMETERS: HCPCS | Performed by: NURSE PRACTITIONER

## 2025-04-07 PROCEDURE — 99214 OFFICE O/P EST MOD 30 MIN: CPT | Performed by: NURSE PRACTITIONER

## 2025-04-07 RX ORDER — CLINDAMYCIN HYDROCHLORIDE 300 MG/1
300 CAPSULE ORAL 2 TIMES DAILY
COMMUNITY
Start: 2025-01-02 | End: 2025-04-07

## 2025-04-07 RX ORDER — IBUPROFEN 800 MG/1
800 TABLET, FILM COATED ORAL 3 TIMES DAILY PRN
COMMUNITY
Start: 2025-01-02 | End: 2025-04-07

## 2025-04-07 NOTE — PROGRESS NOTES
HCV RNA (IU) NOTD HCVIU/mL 6,002,399 !    HCV RNA James NOTD HCV log 10IU/Ml 6.78 !         LIVER HISTOLOGY:  2/2018.  US shear wave elastography was 6.84 kPa suggesting stage 1 fibrosis.  8/2024.  HCV Fibrosure.  0.65 fibrosis, 0.47 inflammation.  Suggests grade 1-2 inflammation and stage 3 fibrosis.    ENDOSCOPIC PROCEDURES:  Not available or performed    RADIOLOGY:  2/2018.  Ultrasound of liver.  Echogenic consistent with chronic liver disease.  No liver mass lesions.  No dilated bile ducts.  No ascites.    OTHER TESTING:  Not available or performed    FOLLOW-UP:  All of the issues listed above in the Assessment and Plan were discussed with the patient.  All questions were answered.  The patient expressed a clear understanding of the above.    No follow-up is needed if the HCV PCR is negative today.  Will order a repeat HCV PCR in 4 weeks if today's is negative to ensure we have two documented negative HCV tests.  If the lab indicates that she did not attain SVR 12, we will refer her for further HCV treatment with [facility].    This office is closing on 06/30/2025 so she cannot be re-treated through this office if she did not attain cure.         He Kaba, DEISYP-C  [facility]  [address], [address]   [place] [address] [phone]

## 2025-04-11 LAB
HCV RNA SERPL NAA+PROBE-LOG IU: NOT DETECTED HCV LOG 10IU/ML
HCV RNA SERPL PROBE AMP-ACNC: NOT DETECTED HCVIU/ML

## 2025-04-21 ENCOUNTER — RESULTS FOLLOW-UP (OUTPATIENT)
Age: 65
End: 2025-04-21

## 2025-04-21 DIAGNOSIS — B18.2 CHRONIC HEPATITIS C WITHOUT HEPATIC COMA (HCC): Primary | ICD-10-CM

## 2025-04-24 NOTE — TELEPHONE ENCOUNTER
Lvm asking pt to return a call to the office to receive the results of her  ----- Message from GAURAV Bradley CNP sent at 4/21/2025  3:10 PM EDT -----  Please let her know that the HCV has been cured/eradicated.  I would like one more negative HCV PRC test.  Let her know that I ordered the repeat lab today.  Thank you.